# Patient Record
Sex: FEMALE | Race: WHITE | NOT HISPANIC OR LATINO | Employment: FULL TIME | URBAN - METROPOLITAN AREA
[De-identification: names, ages, dates, MRNs, and addresses within clinical notes are randomized per-mention and may not be internally consistent; named-entity substitution may affect disease eponyms.]

---

## 2023-04-11 LAB
BASOPHILS (10*3/UL) IN BLOOD BY AUTOMATED COUNT: 0.05 X10E9/L (ref 0–0.1)
BASOPHILS/100 LEUKOCYTES IN BLOOD BY AUTOMATED COUNT: 1 % (ref 0–2)
EOSINOPHILS (10*3/UL) IN BLOOD BY AUTOMATED COUNT: 0.19 X10E9/L (ref 0–0.7)
EOSINOPHILS/100 LEUKOCYTES IN BLOOD BY AUTOMATED COUNT: 3.9 % (ref 0–6)
ERYTHROCYTE DISTRIBUTION WIDTH (RATIO) BY AUTOMATED COUNT: 14.1 % (ref 11.5–14.5)
ERYTHROCYTE MEAN CORPUSCULAR HEMOGLOBIN CONCENTRATION (G/DL) BY AUTOMATED: 31.4 G/DL (ref 32–36)
ERYTHROCYTE MEAN CORPUSCULAR VOLUME (FL) BY AUTOMATED COUNT: 93 FL (ref 80–100)
ERYTHROCYTES (10*6/UL) IN BLOOD BY AUTOMATED COUNT: 4.9 X10E12/L (ref 4–5.2)
HEMATOCRIT (%) IN BLOOD BY AUTOMATED COUNT: 45.5 % (ref 36–46)
HEMOGLOBIN (G/DL) IN BLOOD: 14.3 G/DL (ref 12–16)
IMMATURE GRANULOCYTES/100 LEUKOCYTES IN BLOOD BY AUTOMATED COUNT: 0.2 % (ref 0–0.9)
LEUKOCYTES (10*3/UL) IN BLOOD BY AUTOMATED COUNT: 4.8 X10E9/L (ref 4.4–11.3)
LYMPHOCYTES (10*3/UL) IN BLOOD BY AUTOMATED COUNT: 1.9 X10E9/L (ref 1.2–4.8)
LYMPHOCYTES/100 LEUKOCYTES IN BLOOD BY AUTOMATED COUNT: 39.4 % (ref 13–44)
MONOCYTES (10*3/UL) IN BLOOD BY AUTOMATED COUNT: 0.32 X10E9/L (ref 0.1–1)
MONOCYTES/100 LEUKOCYTES IN BLOOD BY AUTOMATED COUNT: 6.6 % (ref 2–10)
NEUTROPHILS (10*3/UL) IN BLOOD BY AUTOMATED COUNT: 2.35 X10E9/L (ref 1.2–7.7)
NEUTROPHILS/100 LEUKOCYTES IN BLOOD BY AUTOMATED COUNT: 48.9 % (ref 40–80)
PLATELETS (10*3/UL) IN BLOOD AUTOMATED COUNT: 84 X10E9/L (ref 150–450)
PLATELETS GIANT PRESENCE IN BLOOD BY LIGHT MICROSCOPY: NORMAL
RBC MORPHOLOGY IN BLOOD: NORMAL

## 2023-04-12 LAB — COBALAMIN (VITAMIN B12) (PG/ML) IN SER/PLAS: 1070 PG/ML (ref 211–911)

## 2023-04-16 LAB — HOMOCYSTEINE (UMOL/L) IN SER/PLAS: NORMAL

## 2024-05-06 ENCOUNTER — OFFICE VISIT (OUTPATIENT)
Dept: HEMATOLOGY/ONCOLOGY | Facility: HOSPITAL | Age: 59
End: 2024-05-06
Payer: COMMERCIAL

## 2024-05-06 VITALS
HEART RATE: 76 BPM | RESPIRATION RATE: 18 BRPM | SYSTOLIC BLOOD PRESSURE: 140 MMHG | DIASTOLIC BLOOD PRESSURE: 82 MMHG | OXYGEN SATURATION: 100 % | WEIGHT: 179.5 LBS | BODY MASS INDEX: 28.24 KG/M2 | TEMPERATURE: 96.4 F

## 2024-05-06 DIAGNOSIS — D84.9 IMMUNODEFICIENCY (MULTI): Primary | ICD-10-CM

## 2024-05-06 DIAGNOSIS — D69.42: Primary | ICD-10-CM

## 2024-05-06 PROCEDURE — 99214 OFFICE O/P EST MOD 30 MIN: CPT | Performed by: STUDENT IN AN ORGANIZED HEALTH CARE EDUCATION/TRAINING PROGRAM

## 2024-05-06 ASSESSMENT — PAIN SCALES - GENERAL: PAINLEVEL_OUTOF10: 4

## 2024-05-06 NOTE — PROGRESS NOTES
Patient ID: Nancy Pulido is a 58 y.o. female.  Referring Physician: No referring provider defined for this encounter.  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 female with a PMH significant for  L4 sciatica, recurrent respiratory tract infections, left eye keratitis, and evidence of immunodeficiency.   She is followed for her congenital platelet disorder.     Review of data from [facility], where she has been followed by  shows a tail end MYH9 mutation at p.[id number], which reportedly is most common, and has the least association with extra-hematological  manifestations. She has had platelet transfusions before for procedures. Has not responded to IVIG in the past nor steroids. Plts have intermittently been ~100k but also <40K. Last saw  3yrs back.   The diagnosis was suspected after she had a bone marrow biopsy in 2017 (I do not have results), where the pathologist was concerned for MYH9 disease on smear. No underlying hematological malignancy was noted and she has not received chemo/RT in the past.      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 2/2 to recurrent URI and LRI infections she is now on weekly subQ IVIG being managed  by her immunologist in [place]. Self administers.   She is asymptomatic and reports no complaints other than back pain and associated limitation in her activities.   11/21/2022: since our visit pt has started eltrombopag at 75mg daily, and reduced dose to 50mg given her response. She appears to be tolerating  it well, reports her bruising has decreased significantly.   12/8/2022: Pt presents with friend for follow up. Notes some residual bruising from surgery, but no other bleeding. Is not receiving PT, but has  been working  mobility, no bleeding. Has not received the eltrombopag at discharge, therefore has been cutting her pills in half, taking 25mg daily.  Managed with eltrombopag for spinal surgery in 10/2022.   Age appropriate cancer screening: colo never 2/2 plts <100k, cologuard negative 2-3yrs back, mammo 1.5yrs back, no p/h/o cancer   FMH: daughter had a collapsed iliac vein ?2/2 external compression, normal plts. Otherwise negative FMH.   Social history: Pt does not smoke, drinks ETOH 3 days/week, has used marijuana gummies recreationally. 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 Not on AC, nor on anti-platelet therapy.   05/06/24: 3/2024, plt 93k, rest of CBC is WNL, IgG >1600, remains mostly pain free. <1PNA per year. No planned procedures. Klebsiella PNA 1 month back.       Review of Systems - Oncology  10 point review of systems negative except as stated in HPI    Objective   Past Surgical History:   Procedure Laterality Date    OTHER SURGICAL HISTORY  10/23/2019    Hip labral tear repair     Oncology History    No history exists.       BSA: 1.96 meters squared /82 (BP Location: Left arm, Patient Position: Sitting, BP Cuff Size: Adult)   Pulse 76   Temp 35.8 °C (96.4 °F) (Skin)   Resp 18   Wt 81.4 kg (179 lb 8 oz)   SpO2 100%   BMI 28.24 kg/m²   Physical Exam  Vitals reviewed.   Constitutional:       General: She is not in acute distress.     Appearance: She is not toxic-appearing.   HENT:      Head: Normocephalic and atraumatic.   Pulmonary:      Effort: Pulmonary effort is normal.   Neurological:      General: No focal deficit present.      Mental Status: She is oriented to person, place, and time.   Psychiatric:         Mood and Affect: Mood normal.       Assessment/Plan    58 y.o. female with a PMH significant for  recurrent infections (CCNO mutation related to ciliary dyskinesia), chronic thrombocytopenia responsive to transfusions (MYH9 mutation p.D5548L), and chronic back pain now significantly improved.     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 Treated with eltrombopag 75-->50-->25mg with good response in terms of plt count and clinically noted reduced bruising which she has had for many years. Underwent surgery without significant bleeding. Also received a dose fo TXA pre-op. Did not need any platelet transfusions. Post procedure TPO agonist maintained until pt was done with PT, no complication related to bleeding. Had a slight abnormality in LFTs which resolved on stopping the TPO agonist and have resolved on follow up labs from OSH. Her current plts are at baseline and she has no planned procedures for near future.   Plan:  - no indication for sustained therapy at present.   - follow up next: 1-2yrs   - labs prior to follow up: CBC/diff, CMP     Chad Lancaster MD

## 2025-03-19 ENCOUNTER — TELEPHONE (OUTPATIENT)
Dept: ADMISSION | Facility: HOSPITAL | Age: 60
End: 2025-03-19
Payer: COMMERCIAL

## 2025-03-19 DIAGNOSIS — D69.6 THROMBOCYTOPENIA (CMS-HCC): ICD-10-CM

## 2025-03-19 NOTE — TELEPHONE ENCOUNTER
Patient has a hip replacement scheduled for a July 7th. She was placed on eltrombopag back when she had her back surgery in 2022 and would like to start this again if possible. Patient is wondering if she should set up an appt with you? If so, how many months in advance prior to the surgery?

## 2025-03-20 NOTE — TELEPHONE ENCOUNTER
RN called and left a message that patient can call scheduling line to get set up with a FUV with Dr. Lancaster in May (last note says to follow up with 1-2 years).

## 2025-05-28 ENCOUNTER — OFFICE VISIT (OUTPATIENT)
Dept: HEMATOLOGY/ONCOLOGY | Facility: CLINIC | Age: 60
End: 2025-05-28
Payer: COMMERCIAL

## 2025-05-28 VITALS
BODY MASS INDEX: 27.45 KG/M2 | HEART RATE: 76 BPM | WEIGHT: 174.5 LBS | OXYGEN SATURATION: 98 % | RESPIRATION RATE: 18 BRPM | SYSTOLIC BLOOD PRESSURE: 146 MMHG | TEMPERATURE: 97.3 F | DIASTOLIC BLOOD PRESSURE: 87 MMHG

## 2025-05-28 DIAGNOSIS — D69.42: Primary | ICD-10-CM

## 2025-05-28 DIAGNOSIS — D69.6 THROMBOCYTOPENIA: ICD-10-CM

## 2025-05-28 PROCEDURE — 99214 OFFICE O/P EST MOD 30 MIN: CPT | Performed by: STUDENT IN AN ORGANIZED HEALTH CARE EDUCATION/TRAINING PROGRAM

## 2025-05-28 ASSESSMENT — PAIN SCALES - GENERAL: PAINLEVEL_OUTOF10: 0-NO PAIN

## 2025-05-28 NOTE — PROGRESS NOTES
Patient ID: Nancy Pulido is a 59 y.o. female.  Referring Physician: Chad Alvarez MD  [address] [place] [address]  Primary Care Provider: No Assigned PCP Generic Provider, MD  Visit Type: Follow Up  Diagnosis: May Jordiggelin Anomaly, MYH9 disease      Subjective    HPI  59 y.o. female with a PMH significant for  L4 sciatica, recurrent respiratory tract infections, left eye keratitis, and evidence of immunodeficiency.   She is followed for her congenital platelet disorder.     Re thrombocytopenia:   Review of data from [facility], where she has been followed by  shows a tail end MYH9 mutation at p.M2788A, which reportedly is most common, and has the least association with extra-hematological  manifestations. She has had platelet transfusions before for procedures. Has not responded to IVIG in the past nor steroids. Plts have intermittently been ~100k but also <40K. Last saw  3yrs back.   The diagnosis was suspected after she had a bone marrow biopsy in 2017 (I do not have results), where the pathologist was concerned for MYH9 disease on smear. No underlying hematological malignancy was noted and she has not received chemo/RT in the past.      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 2/2 to recurrent URI and LRI infections she is now on weekly subQ IVIG being managed  by her immunologist in [place]. Self administers.   She is asymptomatic and reports no complaints other than back pain and associated limitation in her activities.   11/21/2022: since our visit pt has started eltrombopag at 75mg daily, and reduced dose to 50mg given her response. She appears to be tolerating  it well, reports her bruising has decreased significantly.   12/8/2022: Pt presents with friend for follow up. Notes some residual bruising from surgery, but no other bleeding. Is not  receiving PT, but has  been working mobility, no bleeding. Has not received the eltrombopag at discharge, therefore has been cutting her pills in half, taking 25mg daily.  Managed with eltrombopag for spinal surgery in 10/2022.   Age appropriate cancer screening: colo never 2/2 plts <100k, cologuard negative 2-3yrs back, mammo 1.5yrs back, no p/h/o cancer   FMH: daughter had a collapsed iliac vein ?2/2 external compression, normal plts. Otherwise negative FMH.   Social history: Pt does not smoke, drinks ETOH 3 days/week, has used marijuana gummies recreationally. Runs a wellness center. Not on AC, nor on anti-platelet therapy.   05/06/24: 3/2024, plt 93k, rest of CBC is WNL, IgG >1600, remains mostly pain free. <1PNA per year. No planned procedures. Klebsiella PNA 1 month back.   05/28/25: presents for follow up. Asymptomatic. Plan to have surgery in the beginning of July for hip replacement.     Review of Systems - Oncology  10 point review of systems negative except as stated in HPI    Objective   Past Surgical History:   Procedure Laterality Date    OTHER SURGICAL HISTORY  10/23/2019    Hip labral tear repair     Oncology History    No history exists.       BSA: 1.93 meters squared /87   Pulse 76   Temp 36.3 °C (97.3 °F) (Core)   Resp 18   Wt 79.2 kg (174 lb 8 oz)   SpO2 98%   BMI 27.45 kg/m²   Physical Exam  Vitals reviewed.   Constitutional:       General: She is not in acute distress.     Appearance: She is not toxic-appearing.   HENT:      Head: Normocephalic and atraumatic.   Pulmonary:      Effort: Pulmonary effort is normal.   Neurological:      General: No focal deficit present.      Mental Status: She is oriented to person, place, and time.   Psychiatric:         Mood and Affect: Mood normal.       Assessment/Plan    59 y.o. 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     Review of previous procedural plan: Treated with eltrombopag 75-->50-->25mg with good response in terms of plt count and clinically noted reduced bruising which she has had for many years. Underwent surgery without significant bleeding. Also received a dose fo TXA pre-op. Did not need any platelet transfusions. Post procedure TPO agonist maintained until pt was done with PT, no complication related to bleeding. Had a slight abnormality in LFTs which resolved on stopping the TPO agonist and have resolved on follow up labs from OS. Her current plts are at baseline and she has no planned procedures for near future.     Pt plans to have an anterior approach hip replacement in the month of July at [facility], will need a perioperative plan.   Plan:  - given that the surgery will be after the date of departure from , will address plan with hemophilia team, as they will oversee the execution and get back to surgeon and pt   - follow up next: 1-2yrs   - labs prior to follow up: CBC/diff, CMP     Chad Lancaster MD

## 2025-06-06 ENCOUNTER — LAB (OUTPATIENT)
Dept: LAB | Facility: HOSPITAL | Age: 60
End: 2025-06-06
Payer: COMMERCIAL

## 2025-06-06 DIAGNOSIS — D69.42: Primary | ICD-10-CM

## 2025-06-06 LAB
ALBUMIN SERPL BCP-MCNC: 4.6 G/DL (ref 3.4–5)
ALP SERPL-CCNC: 53 U/L (ref 33–110)
ALT SERPL W P-5'-P-CCNC: 23 U/L (ref 7–45)
ANION GAP SERPL CALC-SCNC: 12 MMOL/L (ref 10–20)
APTT PPP: 29 SECONDS (ref 26–36)
AST SERPL W P-5'-P-CCNC: 24 U/L (ref 9–39)
BILIRUB SERPL-MCNC: 0.5 MG/DL (ref 0–1.2)
BUN SERPL-MCNC: 19 MG/DL (ref 6–23)
CALCIUM SERPL-MCNC: 10.3 MG/DL (ref 8.6–10.6)
CHLORIDE SERPL-SCNC: 104 MMOL/L (ref 98–107)
CO2 SERPL-SCNC: 27 MMOL/L (ref 21–32)
CREAT SERPL-MCNC: 0.65 MG/DL (ref 0.5–1.05)
EGFRCR SERPLBLD CKD-EPI 2021: >90 ML/MIN/1.73M*2
GLUCOSE SERPL-MCNC: 98 MG/DL (ref 74–99)
INR PPP: 0.9 (ref 0.9–1.1)
POTASSIUM SERPL-SCNC: 4.3 MMOL/L (ref 3.5–5.3)
PROT SERPL-MCNC: 7.7 G/DL (ref 6.4–8.2)
PROTHROMBIN TIME: 10.3 SECONDS (ref 9.8–12.4)
SODIUM SERPL-SCNC: 139 MMOL/L (ref 136–145)

## 2025-06-06 PROCEDURE — 36415 COLL VENOUS BLD VENIPUNCTURE: CPT

## 2025-06-06 PROCEDURE — 85730 THROMBOPLASTIN TIME PARTIAL: CPT

## 2025-06-06 PROCEDURE — 85610 PROTHROMBIN TIME: CPT

## 2025-06-06 PROCEDURE — 80053 COMPREHEN METABOLIC PANEL: CPT

## 2025-06-10 ENCOUNTER — TELEPHONE (OUTPATIENT)
Dept: HEMATOLOGY/ONCOLOGY | Facility: CLINIC | Age: 60
End: 2025-06-10
Payer: COMMERCIAL

## 2025-06-10 DIAGNOSIS — D69.6 THROMBOCYTOPENIA: ICD-10-CM

## 2025-06-10 DIAGNOSIS — D69.42: ICD-10-CM

## 2025-06-11 ENCOUNTER — LAB (OUTPATIENT)
Dept: LAB | Facility: CLINIC | Age: 60
End: 2025-06-11
Payer: COMMERCIAL

## 2025-06-11 DIAGNOSIS — D69.42: ICD-10-CM

## 2025-06-11 DIAGNOSIS — D69.6 THROMBOCYTOPENIA: ICD-10-CM

## 2025-06-11 LAB
BASOPHILS # BLD AUTO: 0.08 X10*3/UL (ref 0–0.1)
BASOPHILS NFR BLD AUTO: 1.7 %
EOSINOPHIL # BLD AUTO: 0.14 X10*3/UL (ref 0–0.7)
EOSINOPHIL NFR BLD AUTO: 3 %
ERYTHROCYTE [DISTWIDTH] IN BLOOD BY AUTOMATED COUNT: 12.3 % (ref 11.5–14.5)
GIANT PLATELETS BLD QL SMEAR: NORMAL
HCT VFR BLD AUTO: 44.8 % (ref 36–46)
HGB BLD-MCNC: 14.6 G/DL (ref 12–16)
IMM GRANULOCYTES # BLD AUTO: 0 X10*3/UL (ref 0–0.7)
IMM GRANULOCYTES NFR BLD AUTO: 0 % (ref 0–0.9)
LYMPHOCYTES # BLD AUTO: 1.54 X10*3/UL (ref 1.2–4.8)
LYMPHOCYTES NFR BLD AUTO: 33 %
MCH RBC QN AUTO: 30.3 PG (ref 26–34)
MCHC RBC AUTO-ENTMCNC: 32.6 G/DL (ref 32–36)
MCV RBC AUTO: 93 FL (ref 80–100)
MONOCYTES # BLD AUTO: 0.42 X10*3/UL (ref 0.1–1)
MONOCYTES NFR BLD AUTO: 9 %
NEUTROPHILS # BLD AUTO: 2.48 X10*3/UL (ref 1.2–7.7)
NEUTROPHILS NFR BLD AUTO: 53.3 %
NRBC BLD-RTO: ABNORMAL /100{WBCS}
PLATELET # BLD AUTO: 52 X10*3/UL (ref 150–450)
RBC # BLD AUTO: 4.82 X10*6/UL (ref 4–5.2)
RBC MORPH BLD: NORMAL
WBC # BLD AUTO: 4.7 X10*3/UL (ref 4.4–11.3)

## 2025-06-11 PROCEDURE — 36415 COLL VENOUS BLD VENIPUNCTURE: CPT

## 2025-06-11 PROCEDURE — 85025 COMPLETE CBC W/AUTO DIFF WBC: CPT

## 2025-06-12 ENCOUNTER — SPECIALTY PHARMACY (OUTPATIENT)
Dept: PHARMACY | Facility: CLINIC | Age: 60
End: 2025-06-12

## 2025-06-12 DIAGNOSIS — D69.42: Primary | ICD-10-CM

## 2025-06-12 DIAGNOSIS — D69.6 THROMBOCYTOPENIA: ICD-10-CM

## 2025-06-13 ENCOUNTER — TELEPHONE (OUTPATIENT)
Dept: ADMISSION | Facility: HOSPITAL | Age: 60
End: 2025-06-13
Payer: COMMERCIAL

## 2025-06-13 NOTE — TELEPHONE ENCOUNTER
Pt called with update that her upcoming surgery has been rescheduled for [facility] with new date of 8/29/25.

## 2025-06-19 ENCOUNTER — TELEPHONE (OUTPATIENT)
Dept: HEMATOLOGY/ONCOLOGY | Facility: HOSPITAL | Age: 60
End: 2025-06-19
Payer: COMMERCIAL

## 2025-06-19 DIAGNOSIS — D69.42: ICD-10-CM

## 2025-06-19 DIAGNOSIS — D69.6 THROMBOCYTOPENIA: ICD-10-CM

## 2025-06-19 NOTE — TELEPHONE ENCOUNTER
Sidra calls re prior auth -     It is showing for [employer] it is awaiting review for prior auth.    Please go in the system for prior auth    [id number] prior auth number.  Case number [id number].    Please complete    Message sent

## 2025-06-20 RX ORDER — ELTROMBOPAG 25 MG/1
25 TABLET, FILM COATED ORAL
Qty: 30 TABLET | Refills: 1 | Status: SHIPPED | OUTPATIENT
Start: 2025-06-20

## 2025-07-07 ENCOUNTER — TELEPHONE (OUTPATIENT)
Dept: HEMATOLOGY/ONCOLOGY | Facility: HOSPITAL | Age: 60
End: 2025-07-07
Payer: COMMERCIAL

## 2025-07-08 NOTE — TELEPHONE ENCOUNTER
RN called patient and left a message on voice mail reminding patient of appointment with Dr. Chakraborty and asking patient to call back. Call back instructions reviewed.

## 2025-07-09 ENCOUNTER — OFFICE VISIT (OUTPATIENT)
Dept: HEMATOLOGY/ONCOLOGY | Facility: HOSPITAL | Age: 60
End: 2025-07-09
Payer: COMMERCIAL

## 2025-07-09 ENCOUNTER — LAB (OUTPATIENT)
Dept: LAB | Facility: HOSPITAL | Age: 60
End: 2025-07-09
Payer: COMMERCIAL

## 2025-07-09 ENCOUNTER — DOCUMENTATION (OUTPATIENT)
Dept: PEDIATRIC HEMATOLOGY/ONCOLOGY | Facility: HOSPITAL | Age: 60
End: 2025-07-09

## 2025-07-09 VITALS
WEIGHT: 173.5 LBS | DIASTOLIC BLOOD PRESSURE: 79 MMHG | OXYGEN SATURATION: 99 % | TEMPERATURE: 97.3 F | HEART RATE: 70 BPM | RESPIRATION RATE: 20 BRPM | SYSTOLIC BLOOD PRESSURE: 119 MMHG | BODY MASS INDEX: 27.3 KG/M2

## 2025-07-09 DIAGNOSIS — D80.1 COMMON VARIABLE HYPOGAMMAGLOBULINEMIA (MULTI): ICD-10-CM

## 2025-07-09 DIAGNOSIS — D69.6 THROMBOCYTOPENIA: ICD-10-CM

## 2025-07-09 DIAGNOSIS — E61.1 IRON DEFICIENCY: ICD-10-CM

## 2025-07-09 DIAGNOSIS — D45 POLYCYTHEMIA VERA: ICD-10-CM

## 2025-07-09 DIAGNOSIS — E61.1 IRON DEFICIENCY: Primary | ICD-10-CM

## 2025-07-09 DIAGNOSIS — T14.8XXA BRUISING: ICD-10-CM

## 2025-07-09 LAB
ALBUMIN SERPL BCP-MCNC: 4.5 G/DL (ref 3.4–5)
ALP SERPL-CCNC: 52 U/L (ref 33–110)
ALT SERPL W P-5'-P-CCNC: 20 U/L (ref 7–45)
ANION GAP SERPL CALC-SCNC: 13 MMOL/L (ref 10–20)
APTT PPP: 28 SECONDS (ref 26–36)
AST SERPL W P-5'-P-CCNC: 23 U/L (ref 9–39)
BASOPHILS # BLD AUTO: 0.04 X10*3/UL (ref 0–0.1)
BASOPHILS NFR BLD AUTO: 1.1 %
BILIRUB SERPL-MCNC: 0.5 MG/DL (ref 0–1.2)
BUN SERPL-MCNC: 12 MG/DL (ref 6–23)
CALCIUM SERPL-MCNC: 10.1 MG/DL (ref 8.6–10.3)
CHLORIDE SERPL-SCNC: 104 MMOL/L (ref 98–107)
CO2 SERPL-SCNC: 27 MMOL/L (ref 21–32)
CREAT SERPL-MCNC: 0.7 MG/DL (ref 0.5–1.05)
DOHLE BOD BLD QL SMEAR: PRESENT
EGFRCR SERPLBLD CKD-EPI 2021: >90 ML/MIN/1.73M*2
EOSINOPHIL # BLD AUTO: 0.11 X10*3/UL (ref 0–0.7)
EOSINOPHIL NFR BLD AUTO: 3.1 %
ERYTHROCYTE [DISTWIDTH] IN BLOOD BY AUTOMATED COUNT: 12.2 % (ref 11.5–14.5)
FERRITIN SERPL-MCNC: 138 NG/ML (ref 8–150)
FIBRINOGEN PPP-MCNC: 275 MG/DL (ref 200–400)
GIANT PLATELETS BLD QL SMEAR: NORMAL
GLUCOSE SERPL-MCNC: 91 MG/DL (ref 74–99)
HCT VFR BLD AUTO: 45.4 % (ref 36–46)
HGB BLD-MCNC: 15 G/DL (ref 12–16)
HGB RETIC QN: 34 PG (ref 28–38)
IGA SERPL-MCNC: 174 MG/DL (ref 70–400)
IGG SERPL-MCNC: 1690 MG/DL (ref 700–1600)
IGM SERPL-MCNC: 44 MG/DL (ref 40–230)
IMM GRANULOCYTES # BLD AUTO: 0.01 X10*3/UL (ref 0–0.7)
IMM GRANULOCYTES NFR BLD AUTO: 0.3 % (ref 0–0.9)
IMMATURE RETIC FRACTION: 6.7 %
INR PPP: 0.9 (ref 0.9–1.1)
IRON SATN MFR SERPL: 19 % (ref 25–45)
IRON SERPL-MCNC: 65 UG/DL (ref 35–150)
LYMPHOCYTES # BLD AUTO: 1.39 X10*3/UL (ref 1.2–4.8)
LYMPHOCYTES NFR BLD AUTO: 38.6 %
MCH RBC QN AUTO: 30.7 PG (ref 26–34)
MCHC RBC AUTO-ENTMCNC: 33 G/DL (ref 32–36)
MCV RBC AUTO: 93 FL (ref 80–100)
MONOCYTES # BLD AUTO: 0.34 X10*3/UL (ref 0.1–1)
MONOCYTES NFR BLD AUTO: 9.4 %
NEUTROPHILS # BLD AUTO: 1.71 X10*3/UL (ref 1.2–7.7)
NEUTROPHILS NFR BLD AUTO: 47.5 %
NRBC BLD-RTO: 0 /100 WBCS (ref 0–0)
OVALOCYTES BLD QL SMEAR: NORMAL
PLATELET # BLD AUTO: 82 X10*3/UL (ref 150–450)
POTASSIUM SERPL-SCNC: 4 MMOL/L (ref 3.5–5.3)
PROT SERPL-MCNC: 8 G/DL (ref 6.4–8.2)
PROTHROMBIN TIME: 9.9 SECONDS (ref 9.8–12.4)
RBC # BLD AUTO: 4.88 X10*6/UL (ref 4–5.2)
RBC MORPH BLD: NORMAL
RETICS #: 0.04 X10*6/UL (ref 0.02–0.08)
RETICS/RBC NFR AUTO: 0.8 % (ref 0.5–2)
SODIUM SERPL-SCNC: 140 MMOL/L (ref 136–145)
THROMBIN TIME: 15.8 SECONDS (ref 10.3–16.6)
TIBC SERPL-MCNC: 344 UG/DL (ref 240–445)
UIBC SERPL-MCNC: 279 UG/DL (ref 110–370)
VWF AG ACT/NOR PPP IA: 198 % (ref 50–220)
WBC # BLD AUTO: 3.6 X10*3/UL (ref 4.4–11.3)

## 2025-07-09 PROCEDURE — 99215 OFFICE O/P EST HI 40 MIN: CPT | Mod: 25 | Performed by: INTERNAL MEDICINE

## 2025-07-09 PROCEDURE — 82784 ASSAY IGA/IGD/IGG/IGM EACH: CPT

## 2025-07-09 PROCEDURE — 85670 THROMBIN TIME PLASMA: CPT

## 2025-07-09 PROCEDURE — 83550 IRON BINDING TEST: CPT

## 2025-07-09 PROCEDURE — 85730 THROMBOPLASTIN TIME PARTIAL: CPT

## 2025-07-09 PROCEDURE — 85246 CLOT FACTOR VIII VW ANTIGEN: CPT

## 2025-07-09 PROCEDURE — 36415 COLL VENOUS BLD VENIPUNCTURE: CPT

## 2025-07-09 PROCEDURE — 85045 AUTOMATED RETICULOCYTE COUNT: CPT

## 2025-07-09 PROCEDURE — 85384 FIBRINOGEN ACTIVITY: CPT

## 2025-07-09 PROCEDURE — 1036F TOBACCO NON-USER: CPT | Performed by: INTERNAL MEDICINE

## 2025-07-09 PROCEDURE — 99215 OFFICE O/P EST HI 40 MIN: CPT | Performed by: INTERNAL MEDICINE

## 2025-07-09 PROCEDURE — 85025 COMPLETE CBC W/AUTO DIFF WBC: CPT

## 2025-07-09 PROCEDURE — 85610 PROTHROMBIN TIME: CPT

## 2025-07-09 PROCEDURE — 82728 ASSAY OF FERRITIN: CPT

## 2025-07-09 PROCEDURE — 84075 ASSAY ALKALINE PHOSPHATASE: CPT

## 2025-07-09 RX ORDER — ESTRADIOL 0.04 MG/D
1 PATCH, EXTENDED RELEASE TRANSDERMAL 2 TIMES WEEKLY
COMMUNITY
Start: 2025-07-02

## 2025-07-09 RX ORDER — FAMCICLOVIR 500 MG/1
500 TABLET, FILM COATED ORAL 2 TIMES DAILY
COMMUNITY

## 2025-07-09 RX ORDER — PROGESTERONE 100 MG/1
100 CAPSULE ORAL DAILY
COMMUNITY

## 2025-07-09 RX ORDER — ESTRADIOL 0.04 MG/D
1 FILM, EXTENDED RELEASE TRANSDERMAL 2 TIMES WEEKLY
COMMUNITY
Start: 2025-05-09 | End: 2025-07-09 | Stop reason: SDUPTHER

## 2025-07-09 RX ORDER — BISMUTH SUBSALICYLATE 262 MG
1 TABLET,CHEWABLE ORAL DAILY
COMMUNITY

## 2025-07-09 ASSESSMENT — ENCOUNTER SYMPTOMS
ENDOCRINE NEGATIVE: 1
CONSTITUTIONAL NEGATIVE: 1
MUSCULOSKELETAL NEGATIVE: 1
NEUROLOGICAL NEGATIVE: 1
EYES NEGATIVE: 1
CARDIOVASCULAR NEGATIVE: 1
HEMATOLOGIC/LYMPHATIC NEGATIVE: 1
RESPIRATORY NEGATIVE: 1
GASTROINTESTINAL NEGATIVE: 1
PSYCHIATRIC NEGATIVE: 1

## 2025-07-09 ASSESSMENT — PAIN SCALES - GENERAL: PAINLEVEL_OUTOF10: 6

## 2025-07-09 NOTE — PROGRESS NOTES
Patient ID: Nancy Pulido is a 59 y.o. female.  Referring Physician: No referring provider defined for this encounter.  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  They also have large platelets.  On review of this patient's peripheral blood smear such is seen.  I see Dohle-like bodies in the periphery of neutrophils and large platelets.    This congenital plt disorder manifested with easy bruising in life to the present time.  The patient has been an athlete with running.  She had back surgery last year fir a hernia disc.  She both right and left hip disease and is scheduled to have anterior approach right hip surgery at [facility] on .  She asked for a [facility] surgeon and we found a referral, but do not know how soon she will be able to see him.    The literature indicates that Promacta is used to increase platelet counts in these patients.  No published experience with Nplate.  Promacta successful got her platelet count up to the 300s for her back surgery.      PMHx: as above    Family Hx: no other person has May-Hegglin; A1.  No abnormal bleeding at labor and delivery.    Social Hx: College degree; works in service of the elderly    Review of Systems   Constitutional: Negative.    HENT:  Negative.     Eyes: Negative.    Respiratory: Negative.     Cardiovascular: Negative.    Gastrointestinal: Negative.    Endocrine: Negative.    Genitourinary: Negative.     Musculoskeletal: Negative.    Skin: Negative.    Neurological: Negative.    Hematological: Negative.    Psychiatric/Behavioral: Negative.          Objective   BSA: 1.93 meters squared  /79   Pulse 70   Temp 36.3 °C (97.3 °F) (Core)   Resp 20   Wt 78.7 kg (173 lb 8 oz)   SpO2  99%   BMI 27.30 kg/m²      has a past medical history of Personal history of diseases of the blood and blood-forming organs and certain disorders involving the immune mechanism and Personal history of pneumonia (recurrent).   has a past surgical history that includes Other surgical history (10/23/2019).  Family History[1]  Oncology History    No history exists.       Nancy Pulido  reports that she has never smoked. She has never used smokeless tobacco.  She  reports current alcohol use of about 8.0 standard drinks of alcohol per week.  She  reports current drug use. Drug: Marijuana.    Physical Exam  Vitals reviewed.   Constitutional:       Appearance: Normal appearance. She is normal weight.   HENT:      Head: Normocephalic and atraumatic.      Nose: Nose normal.      Mouth/Throat:      Mouth: Mucous membranes are moist.   Eyes:      Extraocular Movements: Extraocular movements intact.      Conjunctiva/sclera: Conjunctivae normal.      Pupils: Pupils are equal, round, and reactive to light.   Cardiovascular:      Rate and Rhythm: Normal rate and regular rhythm.      Pulses: Normal pulses.      Heart sounds: Normal heart sounds.   Pulmonary:      Effort: Pulmonary effort is normal.      Breath sounds: Normal breath sounds.   Abdominal:      General: Abdomen is flat.   Musculoskeletal:         General: Normal range of motion.      Cervical back: Normal range of motion and neck supple.   Skin:     General: Skin is warm.   Neurological:      General: No focal deficit present.      Mental Status: She is alert and oriented to person, place, and time.   Psychiatric:         Mood and Affect: Mood normal.         Behavior: Behavior normal.     Labs: Soghwvdq=197, 16% iron saturation; PCO=0940 with Hgb=14.4/PCV=44%, MCV=93, platelets 82,000.  Peripheral smear as described above; 47% PMNs and 38% lymphs; normal PT, aPTT, fibrinogen, thrombin time and VWF antigen=198.    Assessment:  My plan is to start Promacta at 25 mg  po daily.  I will measure weekly platelet count to ensure that it does not rise too high - >300,000. I do not know if her [facility] surgeon will accept out care.  That is up to him.  Alternatively, the patient requested to find a  surgeon.  However, she also mentioned that she plans to go to [place] and [place] for 3 weeks from 7/23/25.  Frankly, I do not think all this is possible.  If surgery is planned for 8/29/25 - focus needs to be on that to ensure that the platelet count is ready on Promacta.    Details to follow. Once a secure schedule is make, I will prepare a hemostasis management plan for the patient.      NELIA Chakraborty    WBC   Date/Time Value Ref Range Status   07/09/2025 11:16 AM 3.6 (L) 4.4 - 11.3 x10*3/uL Final   06/11/2025 08:52 AM 4.7 4.4 - 11.3 x10*3/uL Final   04/11/2023 03:08 PM 4.8 4.4 - 11.3 x10E9/L Final   01/13/2023 09:50 AM 4.3 (L) 4.4 - 11.3 x10E9/L Final   12/08/2022 12:00 PM 6.6 4.4 - 11.3 x10E9/L Final     nRBC   Date Value Ref Range Status   07/09/2025 0.0 0.0 - 0.0 /100 WBCs Final   06/11/2025   Final     Comment:     Not Measured   11/30/2022 0.0 0.0 - 0.0 /100 WBC Final   10/06/2022 0.0 0.0 - 0.0 /100 WBC Final   09/28/2022 0.0 0.0 - 0.0 /100 WBC Final     RBC   Date Value Ref Range Status   07/09/2025 4.88 4.00 - 5.20 x10*6/uL Final   06/11/2025 4.82 4.00 - 5.20 x10*6/uL Final   04/11/2023 4.90 4.00 - 5.20 x10E12/L Final   01/13/2023 4.81 4.00 - 5.20 x10E12/L Final   12/08/2022 3.98 (L) 4.00 - 5.20 x10E12/L Final     Hemoglobin   Date Value Ref Range Status   07/09/2025 15.0 12.0 - 16.0 g/dL Final   06/11/2025 14.6 12.0 - 16.0 g/dL Final   04/11/2023 14.3 12.0 - 16.0 g/dL Final   01/13/2023 14.3 12.0 - 16.0 g/dL Final   12/08/2022 11.9 (L) 12.0 - 16.0 g/dL Final     Hematocrit   Date Value Ref Range Status   07/09/2025 45.4 36.0 - 46.0 % Final   06/11/2025 44.8 36.0 - 46.0 % Final   04/11/2023 45.5 36.0 - 46.0 % Final   01/13/2023 45.2 36.0 - 46.0 % Final   12/08/2022 36.8 36.0 - 46.0 %  "Final     MCV   Date/Time Value Ref Range Status   07/09/2025 11:16 AM 93 80 - 100 fL Final   06/11/2025 08:52 AM 93 80 - 100 fL Final   04/11/2023 03:08 PM 93 80 - 100 fL Final   01/13/2023 09:50 AM 94 80 - 100 fL Final   12/08/2022 12:00 PM 92 80 - 100 fL Final     MCH   Date/Time Value Ref Range Status   07/09/2025 11:16 AM 30.7 26.0 - 34.0 pg Final   06/11/2025 08:52 AM 30.3 26.0 - 34.0 pg Final     MCHC   Date/Time Value Ref Range Status   07/09/2025 11:16 AM 33.0 32.0 - 36.0 g/dL Final   06/11/2025 08:52 AM 32.6 32.0 - 36.0 g/dL Final   04/11/2023 03:08 PM 31.4 (L) 32.0 - 36.0 g/dL Final   01/13/2023 09:50 AM 31.6 (L) 32.0 - 36.0 g/dL Final   12/08/2022 12:00 PM 32.3 32.0 - 36.0 g/dL Final     RDW   Date/Time Value Ref Range Status   07/09/2025 11:16 AM 12.2 11.5 - 14.5 % Final   06/11/2025 08:52 AM 12.3 11.5 - 14.5 % Final   04/11/2023 03:08 PM 14.1 11.5 - 14.5 % Final   01/13/2023 09:50 AM 13.2 11.5 - 14.5 % Final   12/08/2022 12:00 PM 13.7 11.5 - 14.5 % Final     Platelets   Date/Time Value Ref Range Status   07/09/2025 11:16 AM 82 (L) 150 - 450 x10*3/uL Final   06/11/2025 08:52 AM 52 (L) 150 - 450 x10*3/uL Final   04/11/2023 03:08 PM 84 (L) 150 - 450 x10E9/L Final     Comment:     Platelet count verified by smear review.   01/13/2023 09:50  (L) 150 - 450 x10E9/L Final   12/08/2022 12:00  150 - 450 x10E9/L Final     No results found for: \"MPV\"  Neutrophils %   Date/Time Value Ref Range Status   07/09/2025 11:16 AM 47.5 40.0 - 80.0 % Final   06/11/2025 08:52 AM 53.3 40.0 - 80.0 % Final   04/11/2023 03:08 PM 48.9 40.0 - 80.0 % Final   01/13/2023 09:50 AM 48.9 40.0 - 80.0 % Final   12/08/2022 12:00 PM 62.3 40.0 - 80.0 % Final     Immature Granulocytes %, Automated   Date/Time Value Ref Range Status   07/09/2025 11:16 AM 0.3 0.0 - 0.9 % Final     Comment:     Immature Granulocyte Count (IG) includes promyelocytes, myelocytes and metamyelocytes but does not include bands. Percent differential counts " (%) should be interpreted in the context of the absolute cell counts (cells/UL).   06/11/2025 08:52 AM 0.0 0.0 - 0.9 % Final     Comment:     Immature Granulocyte Count (IG) includes promyelocytes, myelocytes and metamyelocytes but does not include bands. Percent differential counts (%) should be interpreted in the context of the absolute cell counts (cells/UL).   04/11/2023 03:08 PM 0.2 0.0 - 0.9 % Final     Comment:      Immature Granulocyte Count (IG) includes promyelocytes,    myelocytes and metamyelocytes but does not include bands.   Percent differential counts (%) should be interpreted in the   context of the absolute cell counts (cells/L).     01/13/2023 09:50 AM 0.0 0.0 - 0.9 % Final     Comment:      Immature Granulocyte Count (IG) includes promyelocytes,    myelocytes and metamyelocytes but does not include bands.   Percent differential counts (%) should be interpreted in the   context of the absolute cell counts (cells/L).     12/08/2022 12:00 PM 0.9 0.0 - 0.9 % Final     Comment:      Immature Granulocyte Count (IG) includes promyelocytes,    myelocytes and metamyelocytes but does not include bands.   Percent differential counts (%) should be interpreted in the   context of the absolute cell counts (cells/L).       Lymphocytes %   Date/Time Value Ref Range Status   07/09/2025 11:16 AM 38.6 13.0 - 44.0 % Final   06/11/2025 08:52 AM 33.0 13.0 - 44.0 % Final   04/11/2023 03:08 PM 39.4 13.0 - 44.0 % Final   01/13/2023 09:50 AM 32.7 13.0 - 44.0 % Final   12/08/2022 12:00 PM 22.0 13.0 - 44.0 % Final   11/30/2022 09:41 AM 4.0 13.0 - 44.0 % Final   11/11/2022 03:45 PM 25.0 13.0 - 44.0 % Final     Monocytes %   Date/Time Value Ref Range Status   07/09/2025 11:16 AM 9.4 2.0 - 10.0 % Final   06/11/2025 08:52 AM 9.0 2.0 - 10.0 % Final   04/11/2023 03:08 PM 6.6 2.0 - 10.0 % Final   01/13/2023 09:50 AM 10.4 2.0 - 10.0 % Final   12/08/2022 12:00 PM 10.1 2.0 - 10.0 % Final   11/30/2022 09:41 AM 6.0 2.0 - 10.0 % Final    11/11/2022 03:45 PM 8.0 2.0 - 10.0 % Final     Eosinophils %   Date/Time Value Ref Range Status   07/09/2025 11:16 AM 3.1 0.0 - 6.0 % Final   06/11/2025 08:52 AM 3.0 0.0 - 6.0 % Final   04/11/2023 03:08 PM 3.9 0.0 - 6.0 % Final   01/13/2023 09:50 AM 6.8 0.0 - 6.0 % Final   12/08/2022 12:00 PM 3.9 0.0 - 6.0 % Final   11/30/2022 09:41 AM 0.0 0.0 - 6.0 % Final   11/11/2022 03:45 PM 4.0 0.0 - 6.0 % Final     Basophils %   Date/Time Value Ref Range Status   07/09/2025 11:16 AM 1.1 0.0 - 2.0 % Final   06/11/2025 08:52 AM 1.7 0.0 - 2.0 % Final   04/11/2023 03:08 PM 1.0 0.0 - 2.0 % Final   01/13/2023 09:50 AM 1.2 0.0 - 2.0 % Final   12/08/2022 12:00 PM 0.8 0.0 - 2.0 % Final   11/30/2022 09:41 AM 0.0 0.0 - 2.0 % Final   11/11/2022 03:45 PM 0.0 0.0 - 2.0 % Final     Neutrophils Absolute   Date/Time Value Ref Range Status   07/09/2025 11:16 AM 1.71 1.20 - 7.70 x10*3/uL Final     Comment:     Percent differential counts (%) should be interpreted in the context of the absolute cell counts (cells/uL).   06/11/2025 08:52 AM 2.48 1.20 - 7.70 x10*3/uL Final     Comment:     Percent differential counts (%) should be interpreted in the context of the absolute cell counts (cells/uL).   04/11/2023 03:08 PM 2.35 1.20 - 7.70 x10E9/L Final   01/13/2023 09:50 AM 2.08 1.20 - 7.70 x10E9/L Final   12/08/2022 12:00 PM 4.14 1.20 - 7.70 x10E9/L Final     Immature Granulocytes Absolute, Automated   Date/Time Value Ref Range Status   07/09/2025 11:16 AM 0.01 0.00 - 0.70 x10*3/uL Final   06/11/2025 08:52 AM 0.00 0.00 - 0.70 x10*3/uL Final     Lymphocytes Absolute   Date/Time Value Ref Range Status   07/09/2025 11:16 AM 1.39 1.20 - 4.80 x10*3/uL Final   06/11/2025 08:52 AM 1.54 1.20 - 4.80 x10*3/uL Final   04/11/2023 03:08 PM 1.90 1.20 - 4.80 x10E9/L Final   01/13/2023 09:50 AM 1.39 1.20 - 4.80 x10E9/L Final   12/08/2022 12:00 PM 1.46 1.20 - 4.80 x10E9/L Final     Monocytes Absolute   Date/Time Value Ref Range Status   07/09/2025 11:16 AM 0.34 0.10  "- 1.00 x10*3/uL Final   06/11/2025 08:52 AM 0.42 0.10 - 1.00 x10*3/uL Final   04/11/2023 03:08 PM 0.32 0.10 - 1.00 x10E9/L Final   01/13/2023 09:50 AM 0.44 0.10 - 1.00 x10E9/L Final   12/08/2022 12:00 PM 0.67 0.10 - 1.00 x10E9/L Final     Eosinophils Absolute   Date/Time Value Ref Range Status   07/09/2025 11:16 AM 0.11 0.00 - 0.70 x10*3/uL Final   06/11/2025 08:52 AM 0.14 0.00 - 0.70 x10*3/uL Final   04/11/2023 03:08 PM 0.19 0.00 - 0.70 x10E9/L Final   01/13/2023 09:50 AM 0.29 0.00 - 0.70 x10E9/L Final   12/08/2022 12:00 PM 0.26 0.00 - 0.70 x10E9/L Final   11/30/2022 09:41 AM 0.00 0.00 - 0.70 x10E9/L Final   11/11/2022 03:45 PM 0.20 0.00 - 0.70 x10E9/L Final     Basophils Absolute   Date/Time Value Ref Range Status   07/09/2025 11:16 AM 0.04 0.00 - 0.10 x10*3/uL Final     Comment:     Automated WBC differential has been confirmed by manual smear review   06/11/2025 08:52 AM 0.08 0.00 - 0.10 x10*3/uL Final   04/11/2023 03:08 PM 0.05 0.00 - 0.10 x10E9/L Final     Comment:     Automated WBC differential has been confirmed by manual smear.   01/13/2023 09:50 AM 0.05 0.00 - 0.10 x10E9/L Final     Comment:     Automated WBC differential has been confirmed by manual smear.   12/08/2022 12:00 PM 0.05 0.00 - 0.10 x10E9/L Final     Comment:     Automated WBC differential has been confirmed by manual smear.   11/30/2022 09:41 AM 0.00 0.00 - 0.10 x10E9/L Final   11/11/2022 03:45 PM 0.00 0.00 - 0.10 x10E9/L Final       No components found for: \"PT\"  aPTT   Date/Time Value Ref Range Status   07/09/2025 11:16 AM 28 26 - 36 seconds Final   06/06/2025 11:41 AM 29 26 - 36 seconds Final   09/28/2022 04:07 PM 30 26 - 39 sec Final     Comment:       THE APTT IS NO LONGER USED FOR MONITORING     UNFRACTIONATED HEPARIN THERAPY.    FOR MONITORING HEPARIN THERAPY,     USE THE HEPARIN ASSAY.         Assessment/Plan         Diagnoses and all orders for this visit:  Iron deficiency  -     Iron and TIBC; Future  -     Ferritin; Future  -     " Clinic Appointment Request Follow Up; MALINI CHAKRABORTY; Future  Bruising  -     aPTT; Future  -     Protime-INR; Future  -     Thrombin Time; Future  -     Fibrinogen; Future  -     Von Willebrand Antigen; Future  Thrombocytopenia  -     CBC and Auto Differential; Standing  -     Comprehensive Metabolic Panel; Future  -     Slide Request; Future  -     Reticulocytes; Future  -     CBC and Auto Differential; Future  -     Clinic Appointment Request Follow Up; MALINI CHAKRABORTY; Future  Common variable hypogammaglobulinemia (Multi)  -     Immunoglobulins (IgG, IgA, IgM); Future         Malini Chakraborty MD                              [1] No family history on file.

## 2025-07-09 NOTE — PROGRESS NOTES
New patient for Dr Chakraborty info given and folder. Will get labs. Will get a cbc/d q week at a [facility] . List given to patient. Will start promacta 25 mg 1 every day. Reviewed how to take. lexicomp  handout given. F/UP 10/8/2025. Plan to get THR done in near future . Requesting anterior approach.Shilpa Gu RN

## 2025-07-10 LAB — PATH REVIEW-CBC DIFFERENTIAL: NORMAL

## 2025-07-11 NOTE — PROGRESS NOTES
[facility] PT Consult    Patient: Nancy Pulido  MRN: [id number]  07/11/25    Assessment   Nancy is a 59 y.o. with PMHx congenital platelet disorder seen today in [facility] clinic. Introduced self as PT for [facility] clinic.    Pt has anterior hip replacement scheduled with Dr. Gaffney for 8/29/25 at [facility].     Dr. Goins's information was electronically sent to Nancy for a second opinion if she needs to have surgery here at .      Subjective   Anterior hip scheduled 8/29/25 with Dr. Gaffney at [facility]       Past Medical History: Medical History[1]    Past Surgical History: Surgical History[2]      Shantal Choi, PT         [1]   Past Medical History:  Diagnosis Date    Personal history of diseases of the blood and blood-forming organs and certain disorders involving the immune mechanism     History of anemia    Personal history of pneumonia (recurrent)     History of pneumonia   [2]   Past Surgical History:  Procedure Laterality Date    OTHER SURGICAL HISTORY  10/23/2019    Hip labral tear repair